# Patient Record
Sex: FEMALE | Race: WHITE | NOT HISPANIC OR LATINO | URBAN - NONMETROPOLITAN AREA
[De-identification: names, ages, dates, MRNs, and addresses within clinical notes are randomized per-mention and may not be internally consistent; named-entity substitution may affect disease eponyms.]

---

## 2019-07-03 ENCOUNTER — IMPORTED ENCOUNTER (OUTPATIENT)
Dept: URBAN - NONMETROPOLITAN AREA CLINIC 1 | Facility: CLINIC | Age: 49
End: 2019-07-03

## 2019-07-03 PROBLEM — H25.813: Noted: 2019-07-03

## 2019-07-03 PROBLEM — H52.13: Noted: 2019-07-03

## 2019-07-03 PROBLEM — H52.223: Noted: 2019-07-03

## 2019-07-03 PROBLEM — M06.80: Noted: 2019-07-03

## 2019-07-03 PROCEDURE — S0620 ROUTINE OPHTHALMOLOGICAL EXA: HCPCS

## 2019-07-03 NOTE — PATIENT DISCUSSION
RA -continue to monitorMyopia-Discussed diagnosis with patient. -Explained that people who are myopic are at a higher risk for developing RD/RT and reviewed associated S&S.-Pt to contact our office if symptoms develop. Astigmatism-Discussed diagnosis with patient. Updated spec Rx given. Recommend lens that will provide comfort as well as protect safety and health of eyes. Cataract OU-Not yet surgical. -Reviewed symptoms of advancing cataract growth such as glare and halos and decreased vision.-Continue to monitor for now. Pt will notify us if any new symptoms develop. RTC 1 yr CEE

## 2022-04-09 ASSESSMENT — VISUAL ACUITY
OS_CC: 20/50
OD_CC: 20/60

## 2022-04-09 ASSESSMENT — TONOMETRY
OS_IOP_MMHG: 21
OD_IOP_MMHG: 20

## 2023-05-05 NOTE — PATIENT DISCUSSION
Health Maintenance Due   Topic Date Due    Hepatitis C Screening  Never done    Diabetes Urine Screening  Never done    Pneumococcal Vaccines (Age 0-64) (1 - PCV) Never done    Foot Exam  Never done    Eye Exam  Never done    HIV Screening  Never done    Colorectal Cancer Screening  Never done    Hemoglobin A1c  03/08/2023      Chart reviewed. Triggered LINKS. Updated Care Everywhere.     Bre Fuchs CMA  Population Health Care Coordinator  Primary Care Team      No retinal holes or tears seen on exam. Recommended OBSERVATION. We reviewed the signs and symptoms of retinal tear/retinal detachment and the importance of prompt evaluation should there be increasing floaters, new flashing lights, or decreasing peripheral vision in either eye at any time. Patient understands condition, prognosis and need for follow up care.